# Patient Record
(demographics unavailable — no encounter records)

---

## 2025-04-03 NOTE — PHYSICAL EXAM
[FreeTextEntry1] : Mental status: Awake, alert and oriented x3. No dysarthria, no aphasia. Follows commands.   Cranial nerves: Pupils equally round and reactive to light, visual fields full, no nystagmus, extraocular muscles intact, V1 through V3 intact bilaterally and symmetric, face symmetric, hearing intact to finger rub, palate elevation symmetric, tongue was midline.  Motor: MRC grading 5/5 b/l UE/LE.  strength 5/5. Normal tone and bulk. No abnormal movements.  Sensation: Intact to light touch, pinprick, temperature, vibration. Tenderness to right occipital area upon palpation  Coordination: No dysmetria on finger-to-nose  Reflexes: 2+ in bilateral UE/LE  Gait: Narrow and steady. No ataxia.

## 2025-04-03 NOTE — ASSESSMENT
[FreeTextEntry1] : Ms. Starks is a 73y female HFU for headaches. 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 Has not had recurrence of headaches since DC and neurologic exam is not focal.   Plan: -MR head w/o to r/o central cause of headache -Given ha location and tenderness over right occipital region will obtain MR cervical spine w/o to rule out possible cervicogenic cause of headache -F/u w Dr. Sequeira for aneurysm management.  -OTC PRN for pain relief; educated on rebound ha and when to call office -Educated on importance of BP control and when to go to ER -Avoid triptans if ha continues  f/u 3 months
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
## 2025-05-20 NOTE — ASSESSMENT
[FreeTextEntry1] : As a review, Ms. LALA is a 73-year-old female who presented to the ED in March due to persistent headaches.  Upon workup she was found to have an incidental 3 mm medially directed aneurysm arising from the region of the ophthalmic portion of the right ICA.  Today, she is asymptomatic.   We discussed the option of proceeding with a diagnostic cerebral angiogram for further evaluation of her cerebrovascular anatomy / abnormality. She has requested to proceed.   Discussed the importance of adequate blood pressure control (<140/80)  All questions answered today.    I, Dr. Sequeira, personally performed the evaluation and management (E/M) services for this patient. That E/M includes conducting the initial examination, assessing all conditions, and establishing the plan of care. Today, my ACP was here to observe my evaluation and management services for this patient to be followed going forward.  MS JEAN PAUL MedranoC Senior Physician Assistant [facility]   Pio Sequeira MD [facility] Director, Cerebrovascular & Endovascular Neurosurgery [facility]

## 2025-05-20 NOTE — END OF VISIT
[FreeTextEntry3] :  I, Dr. Sequeira, personally performed the evaluation and management (E/M) services for this new patient.  That E/M includes conducting the initial examination, assessing all conditions, and establishing the plan of care.  Today, my LUIS ALFREDO, was here to observe my evaluation and management services for this patient to be followed going forward.

## 2025-05-20 NOTE — ASSESSMENT
[FreeTextEntry1] : As a review, Ms. LALA is a 73-year-old female who presented to the ED in March due to persistent headaches.  Upon workup she was found to have an incidental 3 mm medially directed aneurysm arising from the region of the ophthalmic portion of the right ICA.  Today, she is asymptomatic.   We discussed the option of proceeding with a diagnostic cerebral angiogram for further evaluation of her cerebrovascular anatomy / abnormality. She has requested to proceed.   Discussed the importance of adequate blood pressure control (<140/80)  All questions answered today.    I, Dr. Sequeira, personally performed the evaluation and management (E/M) services for this patient. That E/M includes conducting the initial examination, assessing all conditions, and establishing the plan of care. Today, my ACP was here to observe my evaluation and management services for this patient to be followed going forward.  MS JEAN PAUL MedranoC Senior Physician Assistant [facility]   Pio Sequiera MD [facility] Director, Cerebrovascular & Endovascular Neurosurgery [facility]

## 2025-05-20 NOTE — HISTORY OF PRESENT ILLNESS
[de-identified] : Ms. LALA is a 73-year-old female who has a past medical history of diverticulitis, osteoporosis, cataracts, and [place] (just recently started using hearing aids).  In March she presented to the ED due to persistent headaches.  Upon workup a CTA head / neck revealed a 3 mm medially directed aneurysm arising from the region of the ophthalmic portion of the right ICA.  Given these findings she was referred for an outpatient endovascular neurosurgery consultation.  Today, she presents feeling well.  She denies headaches, nausea, vomiting, dizziness, or visual changes.  She is a non-smoker.  No known family history of aneurysms or vascular malformations.  We have reviewed her CTA results together.  Current blood pressure is 119/73.  Of note she is seen by her ophthalmologist routinely. She wears glasses and has a history of cataract repairs.   PHYSICAL EXAM:  Mental Status: Alert and Oriented  Language: Intact. Following Commands. No aphasia. No dysarthria.  Cranial Nerves:  Vision: no significant deficits. + glasses. h/o of cataract repair.  No nystagmus  EOMI  No facial asymmetry  Hearing intact + hearing aids.  Motor: intact 5/5.  Sensation: intact  Coordination: No dysmetria  Reflexes: 2+ upper / lower extremities.  Gait: Steady. Walking without assistance.

## 2025-05-20 NOTE — HISTORY OF PRESENT ILLNESS
[de-identified] : Ms. LALA is a 73-year-old female who has a past medical history of diverticulitis, osteoporosis, cataracts, and [place] (just recently started using hearing aids).  In March she presented to the ED due to persistent headaches.  Upon workup a CTA head / neck revealed a 3 mm medially directed aneurysm arising from the region of the ophthalmic portion of the right ICA.  Given these findings she was referred for an outpatient endovascular neurosurgery consultation.  Today, she presents feeling well.  She denies headaches, nausea, vomiting, dizziness, or visual changes.  She is a non-smoker.  No known family history of aneurysms or vascular malformations.  We have reviewed her CTA results together.  Current blood pressure is 119/73.  Of note she is seen by her ophthalmologist routinely. She wears glasses and has a history of cataract repairs.   PHYSICAL EXAM:  Mental Status: Alert and Oriented  Language: Intact. Following Commands. No aphasia. No dysarthria.  Cranial Nerves:  Vision: no significant deficits. + glasses. h/o of cataract repair.  No nystagmus  EOMI  No facial asymmetry  Hearing intact + hearing aids.  Motor: intact 5/5.  Sensation: intact  Coordination: No dysmetria  Reflexes: 2+ upper / lower extremities.  Gait: Steady. Walking without assistance.

## 2025-05-28 NOTE — HISTORY OF PRESENT ILLNESS
[de-identified] : As a review, Ms. LALA is a 73-year-old female who has a past medical history of diverticulitis, osteoporosis, cataracts, and [place] (just recently started using hearing aids).  In March she presented to the ED due to persistent headaches.  Upon workup a CTA head / neck revealed a 3 mm medially directed aneurysm arising from the region of the ophthalmic portion of the right ICA.  Given these findings she was referred for an endovascular neurosurgery consultation. Non-smoker.  No known family history of aneurysms or vascular malformations.    She presents today s/p diagnostic cerebral angiogram which was performed on 5/27/25.  This demonstrated a 5 mm right ICA aneurysm.  Today, she disclosed that after her DSA she experienced some neck pain along with a sensation of mild transient weakness in her right leg. She has confirmed that these symptoms are improving. She is able to walk without assistance. The puncture site reveals mild bruising, however no tenderness, swelling, or bleeding. Current blood pressure is 126/78.   PHYSICAL EXAM:  Mental Status: Alert and Oriented  Language: Intact. Following Commands. No aphasia. No dysarthria.  Cranial Nerves:  Vision: no significant deficits. + glasses. h/o of cataract repair.  No nystagmus  EOMI  No facial asymmetry  Hearing intact + hearing aids.  Motor: intact 5/5, trace weakness right hip flexor.   Sensation: intact  Coordination: No dysmetria  Reflexes: 2+ upper / lower extremities.  Gait: Steady. Walking without assistance.

## 2025-05-28 NOTE — ASSESSMENT
[FreeTextEntry1] : As a review, Ms. LALA is a 73-year-old female who presented to the ED in March due to persistent headaches. Initially she had a CTA followed by a diagnostic cerebral angiogram which confirmed a 5mm RIGHT ICA aneurysm.   Today, we have reviewed the angiogram images / results together. She is a candidate for a right pipeline flow diverting stent for treatment of her right ICA aneurysm. The risks of therapy were discussed including but not limited to death, stroke, bleeding, access site complications, aneurysm rupture/vessel perforation and the need for surveillance. The patient wishes to proceed and will be scheduled accordingly.  An educational video was also reviewed by the patient and her son. She has requested to proceed.   We have discussed the protocol for DAPT, consisting of ASA 81 mg QD and Brilinta 90 mg BID. She was educated that these medications will be started 3 days prior to her procedure, and she will remain on this regimen for 3-6 months post procedure. She is aware we will provide guidance and instructions regarding this medication regimen. Both medications were sent to her pharmacy today.   The importance of adequate blood pressure control (<140/80) was also discussed.   She is agreeable with our plan of care. All questions answered today.   I, Dr. Sequeira, personally performed the evaluation and management (E/M) services for this new patient.  That E/M includes conducting the initial examination, assessing all conditions, and establishing the plan of care.  Today, my LUIS ALFREDO, was here to observe my evaluation and management services for this patient to be followed going forward.   Leyla Singleton MS PA-C Senior Physician Assistant [facility]   Pio Sequeira MD [facility] Director, Cerebrovascular & Endovascular Neurosurgery [facility]